# Patient Record
Sex: MALE | Race: WHITE | NOT HISPANIC OR LATINO | URBAN - METROPOLITAN AREA
[De-identification: names, ages, dates, MRNs, and addresses within clinical notes are randomized per-mention and may not be internally consistent; named-entity substitution may affect disease eponyms.]

---

## 2023-12-19 ENCOUNTER — APPOINTMENT (OUTPATIENT)
Dept: URBAN - METROPOLITAN AREA CLINIC 204 | Age: 73
Setting detail: DERMATOLOGY
End: 2023-12-20

## 2023-12-19 PROCEDURE — OTHER MIPS QUALITY: OTHER

## 2023-12-19 PROCEDURE — 17003 DESTRUCT PREMALG LES 2-14: CPT

## 2023-12-19 PROCEDURE — 99213 OFFICE O/P EST LOW 20 MIN: CPT | Mod: 25

## 2023-12-19 PROCEDURE — 17000 DESTRUCT PREMALG LESION: CPT

## 2024-03-12 ENCOUNTER — APPOINTMENT (OUTPATIENT)
Dept: URBAN - METROPOLITAN AREA CLINIC 204 | Age: 74
Setting detail: DERMATOLOGY
End: 2024-03-13

## 2024-03-12 PROCEDURE — OTHER MIPS QUALITY: OTHER

## 2024-03-12 PROCEDURE — 99213 OFFICE O/P EST LOW 20 MIN: CPT | Mod: 25

## 2024-03-12 PROCEDURE — 17110 DESTRUCT B9 LESION 1-14: CPT

## 2024-03-12 PROCEDURE — 17000 DESTRUCT PREMALG LESION: CPT | Mod: 59

## 2024-03-12 NOTE — PROCEDURE: MIPS QUALITY
Additional Notes: Documentation for MIPS  purposes only. Full Patient visit note from paper chart is available for review and also scanned in EMA chart.
Quality 226: Preventive Care And Screening: Tobacco Use: Screening And Cessation Intervention: Patient screened for tobacco use and is an ex/non-smoker
Quality 47: Advance Care Plan: Advance Care Planning discussed and documented; advance care plan or surrogate decision maker documented in the medical record.
Detail Level: Detailed
Quality 130: Documentation Of Current Medications In The Medical Record: Current Medications Documented

## 2024-03-21 ENCOUNTER — APPOINTMENT (OUTPATIENT)
Dept: UROLOGY | Facility: CLINIC | Age: 74
End: 2024-03-21
Payer: MEDICARE

## 2024-03-22 PROBLEM — R35.1 NOCTURIA: Status: ACTIVE | Noted: 2024-03-22

## 2024-03-22 PROBLEM — N52.9 ERECTILE DYSFUNCTION: Status: ACTIVE | Noted: 2024-03-22

## 2024-03-22 PROBLEM — R97.20 ELEVATED PSA: Status: ACTIVE | Noted: 2024-03-22

## 2024-03-22 PROBLEM — C61 PROSTATE CA (MULTI): Status: ACTIVE | Noted: 2024-03-22

## 2024-03-22 ASSESSMENT — ENCOUNTER SYMPTOMS
PSYCHIATRIC NEGATIVE: 1
COUGH: 0
SHORTNESS OF BREATH: 0
EYES NEGATIVE: 1
NAUSEA: 0
DIFFICULTY URINATING: 0
CHILLS: 0
ENDOCRINE NEGATIVE: 1
ALLERGIC/IMMUNOLOGIC NEGATIVE: 1
FEVER: 0

## 2024-03-22 NOTE — PROGRESS NOTES
Subjective   Patient ID: Tee Rome is a 74 y.o. male.    HPI  Patient is here for 6 month f/u w/ PSA for hx of prostate cancer. S/P SEEDS 9/22. Most recent PSA was 0.12 on 3/24. Prior PSA was done 9/23 and was 0.16..prior PSA was 0.23 on 5/23. Prior PSA was 0.34 on 1/23. Prior PSA was 5.82 on 5/22. Chronic LUT'S sx are mild and stable. Denies urgency and frequency. Denies dysuria. Denies hematuria. Nocturia x1. He is taking Flomax ... Hx of gross hematuria. No recent sx. Normal cysto on 1/23. ED is mild. Cialis is helpful       Review of Systems   Constitutional:  Negative for chills and fever.   HENT: Negative.     Eyes: Negative.    Respiratory:  Negative for cough and shortness of breath.    Cardiovascular:  Negative for chest pain and leg swelling.   Gastrointestinal:  Negative for nausea.   Endocrine: Negative.    Genitourinary:  Negative for difficulty urinating.        Negative except for documented in HPI   Allergic/Immunologic: Negative.    Neurological:         Alert & oriented X 3   Hematological:         Denies blood thinners   Psychiatric/Behavioral: Negative.         Objective   Physical Exam  Vitals and nursing note reviewed.   Constitutional:       General: He is not in acute distress.     Appearance: Normal appearance.   Pulmonary:      Effort: Pulmonary effort is normal.   Abdominal:      Tenderness: There is no abdominal tenderness.   Genitourinary:     Comments: Kidneys non palpable bilaterally  Bladder non palpable or tender  Scrotum no mass, No hydrocele  Epididymis- No spermatocele. Non Tender.  Testicles: No mass Left swmaller  Urethra: No discharge  Penis within normal limits... No lesions. Uncircumcised. No phimosis  Prostate - symmetric, no nodules. Smooth and Flat  Seminal Vesicals: No mass.  Sphincter tone: normal  Neurological:      Mental Status: He is alert.         Assessment/Plan       Diagnoses and all orders for this visit:  Erectile dysfunction, unspecified erectile  dysfunction type  Nocturia  Prostate CA (CMS/[facility])    All available PSA values reviewed, Options discussed. Questions answered.   Diet changes for prostate health discussed and educational information given. Pros/Cons of prostate health supplements discussed.   Treatment options for LUTS reviewed  Flomax refills authorized  Discussed timed voiding. Discussed fluid and caffeine intake  Treatment options for ED reviewed.  Continue Cialis  Lifestyle change to help prevent UTIs discussed. Encouraged fluid intake.    F/U  6 months with PSA

## 2024-03-25 ENCOUNTER — OFFICE VISIT (OUTPATIENT)
Dept: UROLOGY | Facility: CLINIC | Age: 74
End: 2024-03-25
Payer: MEDICARE

## 2024-03-25 VITALS — WEIGHT: 225 LBS | HEIGHT: 70 IN | BODY MASS INDEX: 32.21 KG/M2 | RESPIRATION RATE: 16 BRPM

## 2024-03-25 DIAGNOSIS — C61 PROSTATE CA (MULTI): ICD-10-CM

## 2024-03-25 DIAGNOSIS — N52.9 ERECTILE DYSFUNCTION, UNSPECIFIED ERECTILE DYSFUNCTION TYPE: ICD-10-CM

## 2024-03-25 DIAGNOSIS — R35.1 NOCTURIA: ICD-10-CM

## 2024-03-25 PROCEDURE — 99214 OFFICE O/P EST MOD 30 MIN: CPT | Performed by: UROLOGY

## 2024-03-25 PROCEDURE — 1036F TOBACCO NON-USER: CPT | Performed by: UROLOGY

## 2024-03-25 PROCEDURE — 1159F MED LIST DOCD IN RCRD: CPT | Performed by: UROLOGY

## 2024-03-25 RX ORDER — TAMSULOSIN HYDROCHLORIDE 0.4 MG/1
0.4 CAPSULE ORAL 2 TIMES DAILY
COMMUNITY

## 2024-03-25 RX ORDER — LOSARTAN POTASSIUM AND HYDROCHLOROTHIAZIDE 12.5; 5 MG/1; MG/1
1 TABLET ORAL DAILY
COMMUNITY

## 2024-03-25 RX ORDER — ATORVASTATIN CALCIUM 20 MG/1
20 TABLET, FILM COATED ORAL DAILY
COMMUNITY

## 2024-03-25 RX ORDER — AMLODIPINE BESYLATE 10 MG/1
10 TABLET ORAL DAILY
COMMUNITY

## 2024-03-25 RX ORDER — PANTOPRAZOLE SODIUM 40 MG/1
40 TABLET, DELAYED RELEASE ORAL DAILY
COMMUNITY
Start: 2023-11-09 | End: 2023-11-23 | Stop reason: WASHOUT

## 2024-08-27 ASSESSMENT — ENCOUNTER SYMPTOMS
DIFFICULTY URINATING: 0
FEVER: 0
COUGH: 0
PSYCHIATRIC NEGATIVE: 1
CHILLS: 0
EYES NEGATIVE: 1
SHORTNESS OF BREATH: 0
ENDOCRINE NEGATIVE: 1
ALLERGIC/IMMUNOLOGIC NEGATIVE: 1
NAUSEA: 0

## 2024-08-27 NOTE — PROGRESS NOTES
Subjective   Patient ID: Tee Rome is a 74 y.o. male.    HPI  Patient is here for 6 month f/u w/ PSA for hx of prostate cancer. S/P SEEDS 9/22. Most recent PSA was 0.12 on 8/24. Prior PSA was 0.12 on 3/24. Prior PSA was done 9/23 and was 0.16..prior PSA was 0.23 on 5/23. Prior PSA was 0.34 on 1/23. Prior PSA was 5.82 on 5/22. Chronic LUT'S sx are moderate and worse since last visit. Denies urgency and frequency. Complains of dysuria that started 6 weeks ago. Denies hematuria. Nocturia x1. He is taking Flomax ... Hx of gross hematuria. No recent sx. Normal cysto on 1/23. ED is mild. Cialis is helpful          Review of Systems   Constitutional:  Negative for chills and fever.   HENT: Negative.     Eyes: Negative.    Respiratory:  Negative for cough and shortness of breath.    Cardiovascular:  Negative for chest pain and leg swelling.   Gastrointestinal:  Negative for nausea.   Endocrine: Negative.    Genitourinary:  Negative for difficulty urinating.        Negative except for documented in HPI   Allergic/Immunologic: Negative.    Neurological:         Alert & oriented X 3   Hematological:         Denies blood thinners   Psychiatric/Behavioral: Negative.         Objective   Physical Exam  Vitals and nursing note reviewed.   Constitutional:       General: He is not in acute distress.     Appearance: Normal appearance.   Pulmonary:      Effort: Pulmonary effort is normal.   Abdominal:      Tenderness: There is no abdominal tenderness.   Genitourinary:     Comments: Kidneys non palpable bilaterally  Bladder non palpable or tender  Scrotum no mass, No hydrocele  Epididymis- No spermatocele. Non Tender.  Testicles: No mass. Soft  Urethra: No discharge  Penis within normal limits... No lesions. uncircumcised  Prostate - benign  Neurological:      Mental Status: He is alert.         Assessment/Plan   Diagnoses and all orders for this visit:  Prostate CA (Multi)  Nocturia  -     POCT UA Automated manually  resulted  Erectile dysfunction, unspecified erectile dysfunction type  Dysuria    All available PSA values reviewed, Options discussed. Questions answered.   Diet changes for prostate health discussed and educational information given. Pros/Cons of prostate health supplements discussed.   Treatment options for LUTS reviewed  Continue Flomax  Urogesic Blue Rx given  Discussed timed voiding. Discussed fluid and caffeine intake  Treatment options for ED reviewed.  Continue Cialis PRN  Lifestyle change to help prevent UTIs discussed. Encouraged fluid intake.  UA reveiwed    F/U as scheduled

## 2024-08-28 ENCOUNTER — APPOINTMENT (OUTPATIENT)
Dept: UROLOGY | Facility: CLINIC | Age: 74
End: 2024-08-28
Payer: MEDICARE

## 2024-08-28 VITALS
BODY MASS INDEX: 32.14 KG/M2 | DIASTOLIC BLOOD PRESSURE: 78 MMHG | SYSTOLIC BLOOD PRESSURE: 134 MMHG | WEIGHT: 224 LBS | RESPIRATION RATE: 16 BRPM

## 2024-08-28 DIAGNOSIS — R35.1 NOCTURIA: ICD-10-CM

## 2024-08-28 DIAGNOSIS — N52.9 ERECTILE DYSFUNCTION, UNSPECIFIED ERECTILE DYSFUNCTION TYPE: ICD-10-CM

## 2024-08-28 DIAGNOSIS — R30.0 DYSURIA: ICD-10-CM

## 2024-08-28 DIAGNOSIS — C61 PROSTATE CA (MULTI): ICD-10-CM

## 2024-08-28 LAB
POC APPEARANCE, URINE: CLEAR
POC BILIRUBIN, URINE: NEGATIVE
POC BLOOD, URINE: NEGATIVE
POC COLOR, URINE: YELLOW
POC GLUCOSE, URINE: NEGATIVE MG/DL
POC KETONES, URINE: NEGATIVE MG/DL
POC LEUKOCYTES, URINE: NEGATIVE
POC NITRITE,URINE: NEGATIVE
POC PH, URINE: 6 PH
POC PROTEIN, URINE: NEGATIVE MG/DL
POC SPECIFIC GRAVITY, URINE: 1.02
POC UROBILINOGEN, URINE: 0.2 EU/DL

## 2024-08-28 PROCEDURE — 1159F MED LIST DOCD IN RCRD: CPT | Performed by: UROLOGY

## 2024-08-28 PROCEDURE — 1036F TOBACCO NON-USER: CPT | Performed by: UROLOGY

## 2024-08-28 PROCEDURE — 99214 OFFICE O/P EST MOD 30 MIN: CPT | Performed by: UROLOGY

## 2024-08-28 PROCEDURE — 81003 URINALYSIS AUTO W/O SCOPE: CPT | Performed by: UROLOGY

## 2024-08-28 RX ORDER — URINARY ANTISEPTIC ANTISPASMODIC 81.6; 40.8; 10.8; .12 MG/1; MG/1; MG/1; MG/1
TABLET ORAL
Qty: 30 TABLET | Refills: 1 | Status: SHIPPED | OUTPATIENT
Start: 2024-08-28

## 2024-09-13 DIAGNOSIS — R30.0 DYSURIA: ICD-10-CM

## 2024-09-13 RX ORDER — CIPROFLOXACIN 500 MG/1
500 TABLET ORAL 2 TIMES DAILY
Qty: 10 TABLET | Refills: 0 | Status: SHIPPED | OUTPATIENT
Start: 2024-09-13 | End: 2024-09-18

## 2024-09-16 ENCOUNTER — APPOINTMENT (OUTPATIENT)
Dept: UROLOGY | Facility: CLINIC | Age: 74
End: 2024-09-16
Payer: MEDICARE

## 2024-09-27 ASSESSMENT — ENCOUNTER SYMPTOMS
EYES NEGATIVE: 1
COUGH: 0
ALLERGIC/IMMUNOLOGIC NEGATIVE: 1
PSYCHIATRIC NEGATIVE: 1
SHORTNESS OF BREATH: 0
NAUSEA: 0
DIFFICULTY URINATING: 0
FEVER: 0
ENDOCRINE NEGATIVE: 1
CHILLS: 0

## 2024-09-27 NOTE — PROGRESS NOTES
Subjective   Patient ID: Tee Rome is a 74 y.o. male.    HPI  Patient is here for 1 month follow up. He was seen last visit for dysuria. He was given Urogesic blue and antbx. He states sx have gone away completely. UA was clear last month. He has hx of prostate cancer. S/P SEEDS 9/22. Most recent PSA was 0.12 on 8/24. Prior PSA was 0.12 on 3/24. Prior PSA was done 9/23 and was 0.16..prior PSA was 0.23 on 5/23. Prior PSA was 0.34 on 1/23. Prior PSA was 5.82 on 5/22. Chronic LUT'S sx are moderate and worse since last visit. Denies urgency and frequency. Dysuria has resolved with Tx.. Denies hematuria. Nocturia x1. He is taking Flomax ... Hx of gross hematuria. No recent sx. Normal cysto on 1/23. ED is mild. Cialis is helpful.          Review of Systems   Constitutional:  Negative for chills and fever.   HENT: Negative.     Eyes: Negative.    Respiratory:  Negative for cough and shortness of breath.    Cardiovascular:  Negative for chest pain and leg swelling.   Gastrointestinal:  Negative for nausea.   Endocrine: Negative.    Genitourinary:  Negative for difficulty urinating.        Negative except for documented in HPI   Allergic/Immunologic: Negative.    Neurological:         Alert & oriented X 3   Hematological:         Denies blood thinners   Psychiatric/Behavioral: Negative.         Objective   Physical Exam  Vitals and nursing note reviewed.   Pulmonary:      Effort: Pulmonary effort is normal.   Abdominal:      Palpations: Abdomen is soft.      Tenderness: There is no abdominal tenderness.   Genitourinary:     Comments: Kidneys non palpable bilaterally  Bladder non palpable or tender  Neurological:      Mental Status: He is alert.         Assessment/Plan   Diagnoses and all orders for this visit:  Erectile dysfunction, unspecified erectile dysfunction type  Nocturia  -     tamsulosin (Flomax) 0.4 mg 24 hr capsule; Take 1 capsule (0.4 mg) by mouth once daily.  Prostate CA (Multi)      All available PSA  values reviewed, Options discussed. Questions answered.   Diet changes for prostate health discussed and educational information given. Pros/Cons of prostate health supplements discussed.   Treatment options for LUTS reviewed  Flomax Rx refilled  Discussed timed voiding. Discussed fluid and caffeine intake  Treatment options for ED reviewed.  Cialis is helpful  Lifestyle change to help prevent UTIs discussed. Encouraged fluid intake.  Past UA reviewed    F/U 6 months with PSA

## 2024-09-30 ENCOUNTER — APPOINTMENT (OUTPATIENT)
Dept: UROLOGY | Facility: CLINIC | Age: 74
End: 2024-09-30
Payer: MEDICARE

## 2024-09-30 VITALS
DIASTOLIC BLOOD PRESSURE: 81 MMHG | HEART RATE: 98 BPM | BODY MASS INDEX: 31.28 KG/M2 | WEIGHT: 218 LBS | SYSTOLIC BLOOD PRESSURE: 137 MMHG

## 2024-09-30 DIAGNOSIS — C61 PROSTATE CA (MULTI): ICD-10-CM

## 2024-09-30 DIAGNOSIS — R35.1 NOCTURIA: ICD-10-CM

## 2024-09-30 DIAGNOSIS — N52.9 ERECTILE DYSFUNCTION, UNSPECIFIED ERECTILE DYSFUNCTION TYPE: ICD-10-CM

## 2024-09-30 PROCEDURE — 1159F MED LIST DOCD IN RCRD: CPT | Performed by: UROLOGY

## 2024-09-30 PROCEDURE — 1036F TOBACCO NON-USER: CPT | Performed by: UROLOGY

## 2024-09-30 PROCEDURE — 99214 OFFICE O/P EST MOD 30 MIN: CPT | Performed by: UROLOGY

## 2024-09-30 RX ORDER — TAMSULOSIN HYDROCHLORIDE 0.4 MG/1
0.4 CAPSULE ORAL DAILY
Qty: 90 CAPSULE | Refills: 3 | Status: SHIPPED | OUTPATIENT
Start: 2024-09-30 | End: 2025-09-30

## 2024-12-13 ASSESSMENT — ENCOUNTER SYMPTOMS
DIFFICULTY URINATING: 0
CHILLS: 0
NAUSEA: 0
SHORTNESS OF BREATH: 0
ENDOCRINE NEGATIVE: 1
ALLERGIC/IMMUNOLOGIC NEGATIVE: 1
PSYCHIATRIC NEGATIVE: 1
EYES NEGATIVE: 1
COUGH: 0
FEVER: 0

## 2024-12-13 NOTE — PROGRESS NOTES
Subjective   Patient ID: Tee Rome is a 74 y.o. male.    HPI  Patient is here for dysuria. He states he also has burning around anus. This has been chronic for several months.  He has taken urogesic blue and pyridium in the past. CT done on 11/24 showed large left renal cyst. He does have intermittent left flank pain especially when laying down. He feels this cyst is the source of his pain. He has hx of prostate cancer. S/P SEEDS 9/22. Most recent PSA was 0.12 on 8/24. Prior PSA was 0.12 on 3/24. Prior PSA was done 9/23 and was 0.16..prior PSA was 0.23 on 5/23. Prior PSA was 0.34 on 1/23. Prior PSA was 5.82 on 5/22. Chronic LUT'S sx are moderate and worse since last visit. Some urgency and frequency. . Denies hematuria. Nocturia x1. He is taking Flomax. Hx of gross hematuria. No recent sx. Normal cysto on 1/23. ED is mild. Cialis is helpful.          Review of Systems   Constitutional:  Negative for chills and fever.   HENT: Negative.     Eyes: Negative.    Respiratory:  Negative for cough and shortness of breath.    Cardiovascular:  Negative for chest pain and leg swelling.   Gastrointestinal:  Negative for nausea.   Endocrine: Negative.    Genitourinary:  Negative for difficulty urinating.        Negative except for documented in HPI   Allergic/Immunologic: Negative.    Neurological:         Alert & oriented X 3   Hematological:         Denies blood thinners   Psychiatric/Behavioral: Negative.         Objective   Physical Exam  Vitals and nursing note reviewed.   Constitutional:       General: He is not in acute distress.     Appearance: Normal appearance.   Pulmonary:      Effort: Pulmonary effort is normal.   Abdominal:      Tenderness: There is no abdominal tenderness.   Genitourinary:     Comments: Kidneys non palpable bilaterally  Bladder non palpable or tender  Scrotum no mass, No hydrocele  Epididymis- No spermatocele. Non Tender.  Testicles: No mass  Urethra: No discharge  Penis within normal limits...  "No lesions. Uncircumcised. No phimosis  Sphincter tone: normal. Some hemerrhoidal tissue  Neurological:      Mental Status: He is alert.         Assessment/Plan       Diagnoses and all orders for this visit:  Dysuria  Prostate CA (Multi)  Nocturia  Erectile dysfunction, unspecified erectile dysfunction type      All available PSA values reviewed, Options discussed. Questions answered.   Diet changes for prostate health discussed and educational information given. Pros/Cons of prostate health supplements discussed.   Treatment options for LUTS reviewed  Continue Flomax  Cysto scheduled for Dysuria and urnary Sx  Discussed timed voiding. Discussed fluid and caffeine intake  Treatment options for ED reviewed.  Lifestyle change to help prevent UTIs discussed. Encouraged fluid intake.  UA reviewed  CT reviewed-Patient feels the left renal cyst is the source of his pain. I discussed percutaneous drainage VS laparoscopic  \"unroofing\"  Will refer to Dr Weinberg to discuss    F/U cysto    "

## 2024-12-16 ENCOUNTER — APPOINTMENT (OUTPATIENT)
Dept: UROLOGY | Facility: CLINIC | Age: 74
End: 2024-12-16
Payer: MEDICARE

## 2024-12-16 VITALS — DIASTOLIC BLOOD PRESSURE: 67 MMHG | WEIGHT: 216 LBS | BODY MASS INDEX: 30.99 KG/M2 | SYSTOLIC BLOOD PRESSURE: 129 MMHG

## 2024-12-16 DIAGNOSIS — N28.1 RENAL CYST: ICD-10-CM

## 2024-12-16 DIAGNOSIS — R35.1 NOCTURIA: ICD-10-CM

## 2024-12-16 DIAGNOSIS — C61 PROSTATE CA (MULTI): ICD-10-CM

## 2024-12-16 DIAGNOSIS — R30.0 DYSURIA: ICD-10-CM

## 2024-12-16 DIAGNOSIS — N52.9 ERECTILE DYSFUNCTION, UNSPECIFIED ERECTILE DYSFUNCTION TYPE: ICD-10-CM

## 2024-12-16 PROCEDURE — 1036F TOBACCO NON-USER: CPT | Performed by: UROLOGY

## 2024-12-16 PROCEDURE — 81003 URINALYSIS AUTO W/O SCOPE: CPT | Performed by: UROLOGY

## 2024-12-16 PROCEDURE — 1159F MED LIST DOCD IN RCRD: CPT | Performed by: UROLOGY

## 2024-12-16 PROCEDURE — 99214 OFFICE O/P EST MOD 30 MIN: CPT | Performed by: UROLOGY

## 2024-12-16 RX ORDER — METFORMIN HYDROCHLORIDE 750 MG/1
750 TABLET, EXTENDED RELEASE ORAL
COMMUNITY
Start: 2024-12-10

## 2024-12-26 NOTE — PROGRESS NOTES
Patient ID: Tee Rome is a 74 y.o. male.    Procedures  The patient was prepped using a Betadine solution. Lidocaine jelly was instilled into the urethra. The flexible cystoscope was sterilely inserted into the urethra and formal cystoscopy performed in a systematic fashion. . For detailed findings of the procedure, please see Dr. Landry remarks below  CIPRO 250MG POBID TIMES 3 DAYS GIVEN    PSA 0.12(8/2024)   0.12 (/2024)   0.16 (9/2023)    CT 11/22/2024  1.  No acute abdominal or pelvic process.     2.  Large left renal cyst potentially the source of the patient's pain.     3.  Mild colonic diverticulosis.     4.  Stable small to moderate size pericardial effusion.     NO MASS. NO STONE. NO MEDIAN LOBE. NORMAL CYSTO    WILL REFER BACK TO [facility] TO DISCUSS LARGE RENAL CYST AND FLANK/BACK PAIN AND POSSIBLE DRAINAGE/UNROOFING OF CYST

## 2025-01-09 ENCOUNTER — APPOINTMENT (OUTPATIENT)
Dept: UROLOGY | Facility: CLINIC | Age: 75
End: 2025-01-09
Payer: MEDICARE

## 2025-01-09 VITALS — WEIGHT: 215 LBS | HEIGHT: 70 IN | BODY MASS INDEX: 30.78 KG/M2

## 2025-01-09 DIAGNOSIS — R30.0 DYSURIA: Primary | ICD-10-CM

## 2025-01-09 PROCEDURE — 52000 CYSTOURETHROSCOPY: CPT | Performed by: UROLOGY

## 2025-01-09 RX ORDER — CIPROFLOXACIN 250 MG/1
250 TABLET, FILM COATED ORAL 2 TIMES DAILY
Qty: 6 TABLET | Refills: 0 | Status: SHIPPED | OUTPATIENT
Start: 2025-01-09 | End: 2025-01-12

## 2025-01-09 ASSESSMENT — PATIENT HEALTH QUESTIONNAIRE - PHQ9
1. LITTLE INTEREST OR PLEASURE IN DOING THINGS: NOT AT ALL
2. FEELING DOWN, DEPRESSED OR HOPELESS: NOT AT ALL
SUM OF ALL RESPONSES TO PHQ9 QUESTIONS 1 AND 2: 0

## 2025-03-31 ENCOUNTER — APPOINTMENT (OUTPATIENT)
Dept: UROLOGY | Facility: CLINIC | Age: 75
End: 2025-03-31
Payer: MEDICARE